# Patient Record
Sex: MALE | Race: WHITE
[De-identification: names, ages, dates, MRNs, and addresses within clinical notes are randomized per-mention and may not be internally consistent; named-entity substitution may affect disease eponyms.]

---

## 2017-08-29 ENCOUNTER — HOSPITAL ENCOUNTER (EMERGENCY)
Dept: HOSPITAL 26 - MED | Age: 32
Discharge: HOME | End: 2017-08-29
Payer: MEDICARE

## 2017-08-29 VITALS — DIASTOLIC BLOOD PRESSURE: 88 MMHG | SYSTOLIC BLOOD PRESSURE: 130 MMHG

## 2017-08-29 VITALS — DIASTOLIC BLOOD PRESSURE: 91 MMHG | SYSTOLIC BLOOD PRESSURE: 138 MMHG

## 2017-08-29 VITALS — BODY MASS INDEX: 34.86 KG/M2 | WEIGHT: 230 LBS | HEIGHT: 68 IN

## 2017-08-29 DIAGNOSIS — G47.09: Primary | ICD-10-CM

## 2017-08-29 DIAGNOSIS — Z79.899: ICD-10-CM

## 2017-08-29 DIAGNOSIS — R03.0: ICD-10-CM

## 2017-08-29 DIAGNOSIS — F41.9: ICD-10-CM

## 2017-08-29 NOTE — NUR
Patient discharged with v/s stable. Written and verbal after care instructions 
given and explained. 

Patient alert, oriented and verbalized understanding of instructions. 
Ambulatory with steady gait. All questions addressed prior to discharge. ID 
band removed. Patient advised to follow up with PMD. Rx of ATIVAN 0.5 TID/PRN, 
AMBIEN 5MG HS/PRN given. Patient educated on indication of medication including 
possible reaction and side effects. Opportunity to ask questions provided and 
answered.

## 2017-08-29 NOTE — NUR
31 Y/O M W/C/O ANXIETY, HEART POUNDING, AND UNABLE TO SLEEP WELL X 3WKS. PT 
STATES WENT TO SEE PSYCHIATRIST D/T UNABLE TO SLEEP BUT NOTHING AS PRESCRIBE. 
HX. ANXIETY, DEPRESSION ON TRAZODONE. NO S/S OF DISTRESS NOTED AT THE MOMENT.

## 2022-03-29 ENCOUNTER — HOSPITAL ENCOUNTER (EMERGENCY)
Dept: HOSPITAL 26 - MED | Age: 37
Discharge: HOME | End: 2022-03-29
Payer: COMMERCIAL

## 2022-03-29 VITALS — DIASTOLIC BLOOD PRESSURE: 51 MMHG | SYSTOLIC BLOOD PRESSURE: 124 MMHG

## 2022-03-29 VITALS — WEIGHT: 235 LBS | HEIGHT: 68 IN | BODY MASS INDEX: 35.61 KG/M2

## 2022-03-29 VITALS — SYSTOLIC BLOOD PRESSURE: 118 MMHG | DIASTOLIC BLOOD PRESSURE: 58 MMHG

## 2022-03-29 DIAGNOSIS — M25.519: ICD-10-CM

## 2022-03-29 DIAGNOSIS — Z79.899: ICD-10-CM

## 2022-03-29 DIAGNOSIS — R20.2: ICD-10-CM

## 2022-03-29 DIAGNOSIS — M54.2: Primary | ICD-10-CM

## 2022-03-29 DIAGNOSIS — F41.9: ICD-10-CM

## 2022-03-29 PROCEDURE — 99283 EMERGENCY DEPT VISIT LOW MDM: CPT

## 2022-03-29 PROCEDURE — 96372 THER/PROPH/DIAG INJ SC/IM: CPT

## 2022-03-29 PROCEDURE — 72040 X-RAY EXAM NECK SPINE 2-3 VW: CPT

## 2022-03-29 NOTE — NUR
35 Y/O MALE BIB SELF C/O UPPER BACK PAIN 5/10 WITH TINGLING SENSATION RADIATING 
TO THE NECK XYESTERDAY. PT STATES WAS HELPING MOM PLANTING TREES 2 WEEKS AGO 
AND NOTED NUMBNESS IN BILATERAL HANDS UPON WAKING. PT STATES DECREASED IN 
SENSATION IN THE LEFT HAND COMPARED TO THE RIGHT HAND. 



MEDHX: HTN, BELLS PALSY, ANXIETY

NKA

## 2023-07-14 ENCOUNTER — HOSPITAL ENCOUNTER (EMERGENCY)
Dept: HOSPITAL 26 - MED | Age: 38
LOS: 1 days | Discharge: HOME | End: 2023-07-15
Payer: COMMERCIAL

## 2023-07-14 VITALS
RESPIRATION RATE: 16 BRPM | DIASTOLIC BLOOD PRESSURE: 90 MMHG | SYSTOLIC BLOOD PRESSURE: 152 MMHG | TEMPERATURE: 97.4 F | HEART RATE: 82 BPM | OXYGEN SATURATION: 98 %

## 2023-07-14 VITALS — HEIGHT: 68 IN | WEIGHT: 220 LBS | BODY MASS INDEX: 33.34 KG/M2

## 2023-07-14 DIAGNOSIS — F41.9: ICD-10-CM
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
## 2023-07-14 DIAGNOSIS — I10: ICD-10-CM

## 2023-07-14 DIAGNOSIS — S39.012A: Primary | ICD-10-CM
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
## 2023-07-14 DIAGNOSIS — Z79.899: ICD-10-CM

## 2023-07-14 DIAGNOSIS — M62.830: ICD-10-CM

## 2023-07-14 PROCEDURE — 99284 EMERGENCY DEPT VISIT MOD MDM: CPT

## 2023-07-14 PROCEDURE — 96372 THER/PROPH/DIAG INJ SC/IM: CPT

## 2023-07-15 VITALS
TEMPERATURE: 97.4 F | RESPIRATION RATE: 16 BRPM | OXYGEN SATURATION: 98 % | DIASTOLIC BLOOD PRESSURE: 90 MMHG | HEART RATE: 82 BPM | SYSTOLIC BLOOD PRESSURE: 152 MMHG

## 2023-07-15 NOTE — NUR
Patient discharged with v/s stable. Written and verbal after care instructions 
given and explained. 

Patient alert, oriented and verbalized understanding of instructions. 
Ambulatory with steady gait. All questions addressed prior to discharge. ID 
band removed. Patient advised to follow up with PMD. Rx of FLEXERIL, TYLENOL, 
AND DIDODERM PATCH given. Patient educated on indication of medication 
including possible reaction and side effects. Opportunity to ask questions 
provided and answered.
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
## 2023-10-19 ENCOUNTER — HOSPITAL ENCOUNTER (EMERGENCY)
Dept: HOSPITAL 26 - MED | Age: 38
Discharge: HOME | End: 2023-10-19
Payer: COMMERCIAL

## 2023-10-19 VITALS — WEIGHT: 240 LBS | BODY MASS INDEX: 35.55 KG/M2 | HEIGHT: 69 IN

## 2023-10-19 VITALS
SYSTOLIC BLOOD PRESSURE: 167 MMHG | TEMPERATURE: 98.4 F | OXYGEN SATURATION: 98 % | DIASTOLIC BLOOD PRESSURE: 89 MMHG | HEART RATE: 79 BPM | RESPIRATION RATE: 18 BRPM

## 2023-10-19 VITALS
TEMPERATURE: 97.4 F | OXYGEN SATURATION: 98 % | DIASTOLIC BLOOD PRESSURE: 99 MMHG | SYSTOLIC BLOOD PRESSURE: 169 MMHG | RESPIRATION RATE: 17 BRPM | HEART RATE: 81 BPM

## 2023-10-19 DIAGNOSIS — Z00.00: Primary | ICD-10-CM

## 2023-10-19 DIAGNOSIS — F41.0: ICD-10-CM

## 2023-10-19 DIAGNOSIS — Z79.899: ICD-10-CM
